# Patient Record
Sex: FEMALE | Race: OTHER | NOT HISPANIC OR LATINO
[De-identification: names, ages, dates, MRNs, and addresses within clinical notes are randomized per-mention and may not be internally consistent; named-entity substitution may affect disease eponyms.]

---

## 2019-08-22 PROBLEM — Z00.00 ENCOUNTER FOR PREVENTIVE HEALTH EXAMINATION: Status: ACTIVE | Noted: 2019-08-22

## 2019-09-12 ENCOUNTER — APPOINTMENT (OUTPATIENT)
Dept: ANTEPARTUM | Facility: CLINIC | Age: 29
End: 2019-09-12
Payer: MEDICAID

## 2019-09-12 ENCOUNTER — ASOB RESULT (OUTPATIENT)
Age: 29
End: 2019-09-12

## 2019-09-12 ENCOUNTER — APPOINTMENT (OUTPATIENT)
Dept: ANTEPARTUM | Facility: CLINIC | Age: 29
End: 2019-09-12

## 2019-09-12 PROCEDURE — 99201 OFFICE OUTPATIENT NEW 10 MINUTES: CPT | Mod: 25

## 2019-09-12 PROCEDURE — 76811 OB US DETAILED SNGL FETUS: CPT

## 2019-10-08 ENCOUNTER — OUTPATIENT (OUTPATIENT)
Dept: OUTPATIENT SERVICES | Facility: HOSPITAL | Age: 29
LOS: 1 days | Discharge: LEFT BEFORE DISCHARGE | End: 2019-10-08
Payer: MEDICAID

## 2019-10-10 DIAGNOSIS — F32.9 MAJOR DEPRESSIVE DISORDER, SINGLE EPISODE, UNSPECIFIED: ICD-10-CM

## 2019-10-15 PROCEDURE — 99212 OFFICE O/P EST SF 10 MIN: CPT

## 2019-10-29 ENCOUNTER — APPOINTMENT (OUTPATIENT)
Dept: ANTEPARTUM | Facility: CLINIC | Age: 29
End: 2019-10-29

## 2019-10-31 PROCEDURE — 99214 OFFICE O/P EST MOD 30 MIN: CPT

## 2019-12-03 PROCEDURE — 99214 OFFICE O/P EST MOD 30 MIN: CPT

## 2020-01-01 ENCOUNTER — OUTPATIENT (OUTPATIENT)
Dept: OUTPATIENT SERVICES | Facility: HOSPITAL | Age: 30
LOS: 1 days | End: 2020-01-01
Payer: MEDICAID

## 2020-01-01 PROCEDURE — G9001: CPT

## 2020-01-02 PROCEDURE — 99214 OFFICE O/P EST MOD 30 MIN: CPT

## 2020-01-07 ENCOUNTER — ASOB RESULT (OUTPATIENT)
Age: 30
End: 2020-01-07

## 2020-01-07 ENCOUNTER — APPOINTMENT (OUTPATIENT)
Dept: ANTEPARTUM | Facility: CLINIC | Age: 30
End: 2020-01-07
Payer: MEDICAID

## 2020-01-07 ENCOUNTER — APPOINTMENT (OUTPATIENT)
Dept: ANTEPARTUM | Facility: HOSPITAL | Age: 30
End: 2020-01-07

## 2020-01-07 PROCEDURE — 76816 OB US FOLLOW-UP PER FETUS: CPT

## 2020-01-07 PROCEDURE — 76819 FETAL BIOPHYS PROFIL W/O NST: CPT

## 2020-01-09 ENCOUNTER — OUTPATIENT (OUTPATIENT)
Dept: INPATIENT UNIT | Facility: HOSPITAL | Age: 30
LOS: 1 days | Discharge: HOME | End: 2020-01-09
Payer: MEDICAID

## 2020-01-09 VITALS
HEART RATE: 86 BPM | SYSTOLIC BLOOD PRESSURE: 104 MMHG | RESPIRATION RATE: 16 BRPM | DIASTOLIC BLOOD PRESSURE: 65 MMHG | TEMPERATURE: 99 F | OXYGEN SATURATION: 100 %

## 2020-01-09 VITALS — DIASTOLIC BLOOD PRESSURE: 77 MMHG | SYSTOLIC BLOOD PRESSURE: 115 MMHG | HEART RATE: 80 BPM

## 2020-01-09 DIAGNOSIS — Z98.891 HISTORY OF UTERINE SCAR FROM PREVIOUS SURGERY: Chronic | ICD-10-CM

## 2020-01-09 DIAGNOSIS — O26.899 OTHER SPECIFIED PREGNANCY RELATED CONDITIONS, UNSPECIFIED TRIMESTER: ICD-10-CM

## 2020-01-09 DIAGNOSIS — Z3A.00 WEEKS OF GESTATION OF PREGNANCY NOT SPECIFIED: ICD-10-CM

## 2020-01-09 PROCEDURE — 59025 FETAL NON-STRESS TEST: CPT | Mod: 26

## 2020-01-09 PROCEDURE — 76818 FETAL BIOPHYS PROFILE W/NST: CPT | Mod: 26

## 2020-01-09 PROCEDURE — 99214 OFFICE O/P EST MOD 30 MIN: CPT | Mod: 25

## 2020-01-09 NOTE — OB RN TRIAGE NOTE - CHIEF COMPLAINT QUOTE
contractions every 5-10 minutes and leaking some fluid since yesterday evening  *scheduled c/s 1/15*

## 2020-01-09 NOTE — OB PROVIDER TRIAGE NOTE - PSH
Previous  section  2013 c/s in [facility] arrest of dilation male   2014 repeat c/s in [place] female

## 2020-01-09 NOTE — OB RN TRIAGE NOTE - PSH
Previous  section  2013 c/s in [facility] arrest of dilation male   2014 repeat c/s in [place] female

## 2020-01-09 NOTE — CHART NOTE - NSCHARTNOTEFT_GEN_A_CORE
Received report from WHITNEY Oden NP.    BPP 8/8, cesilia 8.4, cephalic presentation, posterior placenta    nst Received report from WHITNEY Oden NP.    BPP 8/8, cesilia 8.4, cephalic presentation, posterior placenta    nst overall reactive  toco: no ctx noted    Discussed with Dr Mckeon. Dr Mckeon reviewing NST. Will continue to monitor for ~20 mins and if WNL, will d+c home with labor precautions.    Indy NP Received report from WHITNEY Oden NP.    BPP 8/8, cesilia 8.4, cephalic presentation, posterior placenta    nst overall reactive  toco: no ctx noted    Discussed with Dr Mckeon. Dr Mckeon reviewing NST. Will continue to monitor for ~20 mins and if WNL, will d+c home with labor precautions.    1418    NST reactive  toco: occ ctx noted    Discussed with Dr Rogers. Pt discharged home with labor precautions/fetal kick counts reviewed. Encouraged increased PO hydration. F/U next scheduled prenatal appointment.    JEWELL Putnam

## 2020-01-09 NOTE — OB PROVIDER TRIAGE NOTE - PROCEDURE 1
Encounter for suspected premature rupture of amniotic membranes, with rupture of membranes not found

## 2020-01-09 NOTE — OB PROVIDER TRIAGE NOTE - NSOBPROVIDERNOTE_OBGYN_ALL_OB_FT
Turkmen interpretor #  John : [id number]    30 y/o  @ 38.2 wks gest present with c/o ?PROM x's 2 days and irreg uterine ctxns x's 2 days reports 3/10 on pain scale denies any VB reports +FM denies any n/v/d denies any fever or chills ap care uncomplicated thus far pt sched for a rpt  on 1/15/2020, pt also started on Zoloft 50 mg in am x's 3 mos for depression states no counseling no hospitalizations     abdomen: soft, nt on palp  SSE; cervix appears closed and posterior   no evidence of pooling   scant amt white discharge noted  fern- neg  nitrazine- neg   SVE: closed/50/-3  T(C): 37.1 (20 @ 12:50), Max: 37.1 (20 @ 11:54)  HR: 83 (20 @ 12:56) (83 - 86)  BP: 112/69 (20 @ 12:56) (104/65 - 112/69)  RR: 18 (20 @ 12:50) (16 - 18)  SpO2: 100% (20 @ 11:54) (100% - 100%)      NKA  med hx: denies  surg hx:   x's 2  gyn hx: denies  ob hx:  2013 primary  ([place]) FT no comp  3/16/2014 repeat  ([place]) FT no comp  psychosocial hx:   depression dxed with current pregnancy and currently on Zofran 50 mg in AM began 3 mos ago   meds:  PNV  Zoloft 50 mg po in am     will continue to monitor Portuguese interpretor #  John : [id number]    30 y/o  @ 38.2 wks gest present with c/o ?PROM x's 2 days and irreg uterine ctxns x's 2 days reports 3/10 on pain scale denies any VB reports +FM denies any n/v/d denies any fever or chills ap care uncomplicated thus far pt sched for a rpt  on 1/15/2020, pt also started on Zoloft 50 mg in am x's 3 mos for depression states no counseling no hospitalizations     abdomen: soft, nt on palp  SSE; cervix appears closed and posterior   no evidence of pooling   scant amt white discharge noted  fern- neg  nitrazine- neg   SVE: closed/50/-3  T(C): 37.1 (20 @ 12:50), Max: 37.1 (20 @ 11:54)  HR: 83 (20 @ 12:56) (83 - 86)  BP: 112/69 (20 @ 12:56) (104/65 - 112/69)  RR: 18 (20 @ 12:50) (16 - 18)  SpO2: 100% (20 @ 11:54) (100% - 100%)      NKA  med hx: denies  surg hx:   x's 2  gyn hx: denies  ob hx:  2013 primary  ([place]) FT no comp  3/16/2014 repeat  ([place]) FT no comp  psychosocial hx:   depression dxed with current pregnancy and currently on Zofran 50 mg in AM began 3 mos ago   meds:  PNV  Zoloft 50 mg po in am     will continue to monitor    (see chart note)

## 2020-01-14 ENCOUNTER — TRANSCRIPTION ENCOUNTER (OUTPATIENT)
Age: 30
End: 2020-01-14

## 2020-01-15 ENCOUNTER — INPATIENT (INPATIENT)
Facility: HOSPITAL | Age: 30
LOS: 2 days | Discharge: HOME | End: 2020-01-18
Attending: OBSTETRICS & GYNECOLOGY | Admitting: OBSTETRICS & GYNECOLOGY
Payer: MEDICAID

## 2020-01-15 VITALS — SYSTOLIC BLOOD PRESSURE: 103 MMHG | HEART RATE: 88 BPM | TEMPERATURE: 98 F | DIASTOLIC BLOOD PRESSURE: 75 MMHG

## 2020-01-15 DIAGNOSIS — O34.29 MATERNAL CARE DUE TO UTERINE SCAR FROM OTHER PREVIOUS SURGERY: ICD-10-CM

## 2020-01-15 DIAGNOSIS — Z98.891 HISTORY OF UTERINE SCAR FROM PREVIOUS SURGERY: Chronic | ICD-10-CM

## 2020-01-15 DIAGNOSIS — Z98.891 HISTORY OF UTERINE SCAR FROM PREVIOUS SURGERY: ICD-10-CM

## 2020-01-15 LAB
BLD GP AB SCN SERPL QL: NEGATIVE — SIGNIFICANT CHANGE UP
HCT VFR BLD CALC: 39.2 % — SIGNIFICANT CHANGE UP (ref 34.5–45)
HGB BLD-MCNC: 12.6 G/DL — SIGNIFICANT CHANGE UP (ref 11.5–15.5)
MCHC RBC-ENTMCNC: 27.6 PG — SIGNIFICANT CHANGE UP (ref 27–34)
MCHC RBC-ENTMCNC: 32.1 % — SIGNIFICANT CHANGE UP (ref 32–36)
MCV RBC AUTO: 85.8 FL — SIGNIFICANT CHANGE UP (ref 80–100)
NRBC # FLD: 0 K/UL — SIGNIFICANT CHANGE UP (ref 0–0)
PLATELET # BLD AUTO: 204 K/UL — SIGNIFICANT CHANGE UP (ref 150–400)
PMV BLD: 11.1 FL — SIGNIFICANT CHANGE UP (ref 7–13)
RBC # BLD: 4.57 M/UL — SIGNIFICANT CHANGE UP (ref 3.8–5.2)
RBC # FLD: 14.7 % — HIGH (ref 10.3–14.5)
RH IG SCN BLD-IMP: NEGATIVE — SIGNIFICANT CHANGE UP
RH IG SCN BLD-IMP: NEGATIVE — SIGNIFICANT CHANGE UP
WBC # BLD: 7.22 K/UL — SIGNIFICANT CHANGE UP (ref 3.8–10.5)
WBC # FLD AUTO: 7.22 K/UL — SIGNIFICANT CHANGE UP (ref 3.8–10.5)

## 2020-01-15 PROCEDURE — 59514 CESAREAN DELIVERY ONLY: CPT | Mod: AS,U9

## 2020-01-15 RX ORDER — HYDROMORPHONE HYDROCHLORIDE 2 MG/ML
0.8 INJECTION INTRAMUSCULAR; INTRAVENOUS; SUBCUTANEOUS
Refills: 0 | Status: DISCONTINUED | OUTPATIENT
Start: 2020-01-15 | End: 2020-01-16

## 2020-01-15 RX ORDER — MAGNESIUM HYDROXIDE 400 MG/1
30 TABLET, CHEWABLE ORAL
Refills: 0 | Status: DISCONTINUED | OUTPATIENT
Start: 2020-01-15 | End: 2020-01-18

## 2020-01-15 RX ORDER — FERROUS SULFATE 325(65) MG
325 TABLET ORAL DAILY
Refills: 0 | Status: DISCONTINUED | OUTPATIENT
Start: 2020-01-15 | End: 2020-01-18

## 2020-01-15 RX ORDER — SERTRALINE 25 MG/1
50 TABLET, FILM COATED ORAL DAILY
Refills: 0 | Status: DISCONTINUED | OUTPATIENT
Start: 2020-01-15 | End: 2020-01-18

## 2020-01-15 RX ORDER — BUTORPHANOL TARTRATE 2 MG/ML
0.12 INJECTION, SOLUTION INTRAMUSCULAR; INTRAVENOUS EVERY 6 HOURS
Refills: 0 | Status: DISCONTINUED | OUTPATIENT
Start: 2020-01-15 | End: 2020-01-16

## 2020-01-15 RX ORDER — SODIUM CHLORIDE 9 MG/ML
1000 INJECTION, SOLUTION INTRAVENOUS
Refills: 0 | Status: DISCONTINUED | OUTPATIENT
Start: 2020-01-15 | End: 2020-01-16

## 2020-01-15 RX ORDER — MORPHINE SULFATE 50 MG/1
0.15 CAPSULE, EXTENDED RELEASE ORAL ONCE
Refills: 0 | Status: DISCONTINUED | OUTPATIENT
Start: 2020-01-15 | End: 2020-01-16

## 2020-01-15 RX ORDER — SIMETHICONE 80 MG/1
80 TABLET, CHEWABLE ORAL EVERY 4 HOURS
Refills: 0 | Status: DISCONTINUED | OUTPATIENT
Start: 2020-01-15 | End: 2020-01-18

## 2020-01-15 RX ORDER — TETANUS TOXOID, REDUCED DIPHTHERIA TOXOID AND ACELLULAR PERTUSSIS VACCINE, ADSORBED 5; 2.5; 8; 8; 2.5 [IU]/.5ML; [IU]/.5ML; UG/.5ML; UG/.5ML; UG/.5ML
0.5 SUSPENSION INTRAMUSCULAR ONCE
Refills: 0 | Status: DISCONTINUED | OUTPATIENT
Start: 2020-01-15 | End: 2020-01-18

## 2020-01-15 RX ORDER — SODIUM CHLORIDE 9 MG/ML
1000 INJECTION, SOLUTION INTRAVENOUS
Refills: 0 | Status: DISCONTINUED | OUTPATIENT
Start: 2020-01-15 | End: 2020-01-15

## 2020-01-15 RX ORDER — SENNA PLUS 8.6 MG/1
1 TABLET ORAL
Refills: 0 | Status: DISCONTINUED | OUTPATIENT
Start: 2020-01-15 | End: 2020-01-18

## 2020-01-15 RX ORDER — METOCLOPRAMIDE HCL 10 MG
10 TABLET ORAL ONCE
Refills: 0 | Status: COMPLETED | OUTPATIENT
Start: 2020-01-15 | End: 2020-01-15

## 2020-01-15 RX ORDER — OXYCODONE HYDROCHLORIDE 5 MG/1
5 TABLET ORAL
Refills: 0 | Status: COMPLETED | OUTPATIENT
Start: 2020-01-15 | End: 2020-01-22

## 2020-01-15 RX ORDER — DIPHENHYDRAMINE HCL 50 MG
25 CAPSULE ORAL EVERY 6 HOURS
Refills: 0 | Status: DISCONTINUED | OUTPATIENT
Start: 2020-01-15 | End: 2020-01-18

## 2020-01-15 RX ORDER — OXYCODONE HYDROCHLORIDE 5 MG/1
10 TABLET ORAL
Refills: 0 | Status: DISCONTINUED | OUTPATIENT
Start: 2020-01-15 | End: 2020-01-16

## 2020-01-15 RX ORDER — LANOLIN
1 OINTMENT (GRAM) TOPICAL EVERY 6 HOURS
Refills: 0 | Status: DISCONTINUED | OUTPATIENT
Start: 2020-01-15 | End: 2020-01-18

## 2020-01-15 RX ORDER — OXYCODONE HYDROCHLORIDE 5 MG/1
5 TABLET ORAL
Refills: 0 | Status: DISCONTINUED | OUTPATIENT
Start: 2020-01-15 | End: 2020-01-16

## 2020-01-15 RX ORDER — FAMOTIDINE 10 MG/ML
20 INJECTION INTRAVENOUS ONCE
Refills: 0 | Status: COMPLETED | OUTPATIENT
Start: 2020-01-15 | End: 2020-01-15

## 2020-01-15 RX ORDER — KETOROLAC TROMETHAMINE 30 MG/ML
30 SYRINGE (ML) INJECTION EVERY 6 HOURS
Refills: 0 | Status: DISCONTINUED | OUTPATIENT
Start: 2020-01-15 | End: 2020-01-16

## 2020-01-15 RX ORDER — HEPARIN SODIUM 5000 [USP'U]/ML
5000 INJECTION INTRAVENOUS; SUBCUTANEOUS EVERY 12 HOURS
Refills: 0 | Status: DISCONTINUED | OUTPATIENT
Start: 2020-01-15 | End: 2020-01-18

## 2020-01-15 RX ORDER — NALOXONE HYDROCHLORIDE 4 MG/.1ML
0.1 SPRAY NASAL
Refills: 0 | Status: DISCONTINUED | OUTPATIENT
Start: 2020-01-15 | End: 2020-01-16

## 2020-01-15 RX ORDER — ONDANSETRON 8 MG/1
4 TABLET, FILM COATED ORAL EVERY 6 HOURS
Refills: 0 | Status: DISCONTINUED | OUTPATIENT
Start: 2020-01-15 | End: 2020-01-16

## 2020-01-15 RX ORDER — INFLUENZA VIRUS VACCINE 15; 15; 15; 15 UG/.5ML; UG/.5ML; UG/.5ML; UG/.5ML
0.5 SUSPENSION INTRAMUSCULAR ONCE
Refills: 0 | Status: DISCONTINUED | OUTPATIENT
Start: 2020-01-15 | End: 2020-01-18

## 2020-01-15 RX ORDER — GLYCERIN ADULT
1 SUPPOSITORY, RECTAL RECTAL AT BEDTIME
Refills: 0 | Status: DISCONTINUED | OUTPATIENT
Start: 2020-01-15 | End: 2020-01-18

## 2020-01-15 RX ORDER — CITRIC ACID/SODIUM CITRATE 300-500 MG
30 SOLUTION, ORAL ORAL ONCE
Refills: 0 | Status: COMPLETED | OUTPATIENT
Start: 2020-01-15 | End: 2020-01-15

## 2020-01-15 RX ORDER — SODIUM CHLORIDE 9 MG/ML
1000 INJECTION, SOLUTION INTRAVENOUS ONCE
Refills: 0 | Status: COMPLETED | OUTPATIENT
Start: 2020-01-15 | End: 2020-01-15

## 2020-01-15 RX ORDER — OXYCODONE HYDROCHLORIDE 5 MG/1
5 TABLET ORAL ONCE
Refills: 0 | Status: DISCONTINUED | OUTPATIENT
Start: 2020-01-15 | End: 2020-01-18

## 2020-01-15 RX ORDER — OXYTOCIN 10 UNIT/ML
333.33 VIAL (ML) INJECTION
Qty: 20 | Refills: 0 | Status: DISCONTINUED | OUTPATIENT
Start: 2020-01-15 | End: 2020-01-16

## 2020-01-15 RX ORDER — ACETAMINOPHEN 500 MG
975 TABLET ORAL
Refills: 0 | Status: DISCONTINUED | OUTPATIENT
Start: 2020-01-15 | End: 2020-01-18

## 2020-01-15 RX ORDER — IBUPROFEN 200 MG
600 TABLET ORAL EVERY 6 HOURS
Refills: 0 | Status: COMPLETED | OUTPATIENT
Start: 2020-01-15 | End: 2020-12-13

## 2020-01-15 RX ADMIN — Medication 30 MILLILITER(S): at 10:30

## 2020-01-15 RX ADMIN — SODIUM CHLORIDE 200 MILLILITER(S): 9 INJECTION, SOLUTION INTRAVENOUS at 10:30

## 2020-01-15 RX ADMIN — SODIUM CHLORIDE 2000 MILLILITER(S): 9 INJECTION, SOLUTION INTRAVENOUS at 10:30

## 2020-01-15 RX ADMIN — Medication 1000 MILLIUNIT(S)/MIN: at 14:24

## 2020-01-15 RX ADMIN — Medication 30 MILLIGRAM(S): at 20:20

## 2020-01-15 RX ADMIN — FAMOTIDINE 20 MILLIGRAM(S): 10 INJECTION INTRAVENOUS at 10:30

## 2020-01-15 RX ADMIN — Medication 10 MILLIGRAM(S): at 10:31

## 2020-01-15 RX ADMIN — Medication 30 MILLIGRAM(S): at 20:03

## 2020-01-15 RX ADMIN — Medication 325 MILLIGRAM(S): at 20:03

## 2020-01-15 RX ADMIN — HEPARIN SODIUM 5000 UNIT(S): 5000 INJECTION INTRAVENOUS; SUBCUTANEOUS at 20:03

## 2020-01-15 RX ADMIN — SODIUM CHLORIDE 2000 MILLILITER(S): 9 INJECTION, SOLUTION INTRAVENOUS at 14:24

## 2020-01-15 RX ADMIN — SODIUM CHLORIDE 75 MILLILITER(S): 9 INJECTION, SOLUTION INTRAVENOUS at 14:24

## 2020-01-15 NOTE — OB PROVIDER H&P - ASSESSMENT
Admit to INGRID Rodriguez MD  [facility] rLTCS  NPO  routine labs  EFM/TOCO  anesthesia consult  Haylee Cavazos PAC  01-15-20 @ 10:59

## 2020-01-15 NOTE — OB RN DELIVERY SUMMARY - NS_SEPSISRSKCALC_OBGYN_ALL_OB_FT
EOS calculated successfully. EOS Risk Factor: 0.5/1000 live births ([facility] national incidence); GA=39w1d; Temp=98.42; ROM=0.017; GBS='Negative'; Antibiotics='No antibiotics or any antibiotics < 2 hrs prior to birth'

## 2020-01-15 NOTE — OB NEONATOLOGY/PEDIATRICIAN DELIVERY SUMMARY - NSPEDSNEONOTESA_OBGYN_ALL_OB_FT
28 yo  mom with hx of 2 prior C section delivered via a scheduled repeat C section at 39+ 1 weeks. Maternal hx significant for anxiety and depression on zoloft. Maternal labs: B-/ received Rhogam at 28 weeks, /NNI/ GBS negative  Baby born vigorous, routine new born care in the OR.   Mom wants to breast and bottle feed/ wants HepB, wants circ. PMD Dr Katie Lowery.

## 2020-01-15 NOTE — OB PROVIDER H&P - PSH
Previous  section  2013 c/s in [facility] arrest of dilation male   2014 repeat c/s in [place] female

## 2020-01-15 NOTE — OB RN PATIENT PROFILE - PSH
Previous  section  2013 c/s in [facility] arrest of dilation male   2014 repeat c/s in [place] female

## 2020-01-15 NOTE — OB PROVIDER H&P - PMH
Anxiety    Depression, unspecified depression type    Gastroesophageal reflux disease without esophagitis

## 2020-01-15 NOTE — OB PROVIDER DELIVERY SUMMARY - NSPROVIDERDELIVERYNOTE_OBGYN_ALL_OB_FT
Viable male infant, apgar 9/9, wgt 6.2 #  cephalic presentation, light mec  fluid  hysterotomy closed in single layer   grossly nl uterus, tubes & ovaries  EBL: 200  UOP: 100  IVF: 1500  Dictation Number: [id number]

## 2020-01-15 NOTE — OB PROVIDER H&P - HISTORY OF PRESENT ILLNESS
30yo female  EDC/  presents@ 39.1 wks for scheduled  rltcs.  AP course  unremarkable. +PMHx anxiety,depression & GERD. Takes Zoloft qd  Denies VB,ROM or UCs today.  Pt interviewed via  phone- Gianni #[id number]

## 2020-01-15 NOTE — BRIEF OPERATIVE NOTE - OPERATION/FINDINGS
Viable male infant, apgar 9/9, wgt 6.2 #  cephalic presentation, light mec  fluid  hysterotomy closed in single layer   grossly nl uterus, tubes & ovaries  EBL: 200  UOP: 100  IVF: 1500  Dictation Number: [id number]

## 2020-01-15 NOTE — OB PROVIDER H&P - NSHPPHYSICALEXAM_GEN_ALL_CORE
T(C): 36.9 (01-15-20 @ 09:47), Max: 36.9 (01-15-20 @ 09:10)  HR: 88 (01-15-20 @ 09:47) (88 - 88)  BP: 103/75 (01-15-20 @ 09:47) (103/75 - 103/75)  RR: 12 (01-15-20 @ 09:47) (12 - 12)  SpO2: --Height (cm): 165.1 (01-15-20 @ 09:47)  Weight (kg): 83.9 (01-15-20 @ 09:47)  BMI (kg/m2): 30.8 (01-15-20 @ 09:47)  BSA (m2): 1.91 (01-15-20 @ 09:47)    A&Ox3  Heart: RRR, S1&S2, no S3  Lungs: Clear bilateral to auscultation, good inspiratory /expiratory effort              no rhonchi, no rales  Abd: soft, Gravid, TOCO in place           +pfannenstial scar/hypertrophied  EFM:  120 bpm/moderate variabilty/+accelerations/no decelerations/CAT 1  TOCO:  no UC  Ext:  FROM / no edema  Neuro: grossly intact

## 2020-01-16 ENCOUNTER — TRANSCRIPTION ENCOUNTER (OUTPATIENT)
Age: 30
End: 2020-01-16

## 2020-01-16 LAB
BASOPHILS # BLD AUTO: 0.03 K/UL — SIGNIFICANT CHANGE UP (ref 0–0.2)
BASOPHILS NFR BLD AUTO: 0.3 % — SIGNIFICANT CHANGE UP (ref 0–2)
EOSINOPHIL # BLD AUTO: 0.08 K/UL — SIGNIFICANT CHANGE UP (ref 0–0.5)
EOSINOPHIL NFR BLD AUTO: 0.8 % — SIGNIFICANT CHANGE UP (ref 0–6)
HCT VFR BLD CALC: 32.7 % — LOW (ref 34.5–45)
HGB BLD-MCNC: 10.3 G/DL — LOW (ref 11.5–15.5)
IMM GRANULOCYTES NFR BLD AUTO: 0.4 % — SIGNIFICANT CHANGE UP (ref 0–1.5)
LYMPHOCYTES # BLD AUTO: 1.23 K/UL — SIGNIFICANT CHANGE UP (ref 1–3.3)
LYMPHOCYTES # BLD AUTO: 12.9 % — LOW (ref 13–44)
MCHC RBC-ENTMCNC: 27.4 PG — SIGNIFICANT CHANGE UP (ref 27–34)
MCHC RBC-ENTMCNC: 31.5 % — LOW (ref 32–36)
MCV RBC AUTO: 87 FL — SIGNIFICANT CHANGE UP (ref 80–100)
MONOCYTES # BLD AUTO: 0.6 K/UL — SIGNIFICANT CHANGE UP (ref 0–0.9)
MONOCYTES NFR BLD AUTO: 6.3 % — SIGNIFICANT CHANGE UP (ref 2–14)
NEUTROPHILS # BLD AUTO: 7.56 K/UL — HIGH (ref 1.8–7.4)
NEUTROPHILS NFR BLD AUTO: 79.3 % — HIGH (ref 43–77)
NRBC # FLD: 0 K/UL — SIGNIFICANT CHANGE UP (ref 0–0)
PLATELET # BLD AUTO: 170 K/UL — SIGNIFICANT CHANGE UP (ref 150–400)
PMV BLD: 10.5 FL — SIGNIFICANT CHANGE UP (ref 7–13)
RBC # BLD: 3.76 M/UL — LOW (ref 3.8–5.2)
RBC # FLD: 14.8 % — HIGH (ref 10.3–14.5)
T PALLIDUM AB TITR SER: NEGATIVE — SIGNIFICANT CHANGE UP
WBC # BLD: 9.54 K/UL — SIGNIFICANT CHANGE UP (ref 3.8–10.5)
WBC # FLD AUTO: 9.54 K/UL — SIGNIFICANT CHANGE UP (ref 3.8–10.5)

## 2020-01-16 RX ORDER — SERTRALINE 25 MG/1
1 TABLET, FILM COATED ORAL
Qty: 0 | Refills: 0 | DISCHARGE

## 2020-01-16 RX ORDER — OXYCODONE HYDROCHLORIDE 5 MG/1
5 TABLET ORAL
Refills: 0 | Status: DISCONTINUED | OUTPATIENT
Start: 2020-01-16 | End: 2020-01-18

## 2020-01-16 RX ORDER — IBUPROFEN 200 MG
600 TABLET ORAL EVERY 6 HOURS
Refills: 0 | Status: DISCONTINUED | OUTPATIENT
Start: 2020-01-16 | End: 2020-01-18

## 2020-01-16 RX ORDER — OXYCODONE HYDROCHLORIDE 5 MG/1
5 TABLET ORAL ONCE
Refills: 0 | Status: DISCONTINUED | OUTPATIENT
Start: 2020-01-16 | End: 2020-01-17

## 2020-01-16 RX ORDER — OXYCODONE HYDROCHLORIDE 5 MG/1
5 TABLET ORAL ONCE
Refills: 0 | Status: DISCONTINUED | OUTPATIENT
Start: 2020-01-16 | End: 2020-01-18

## 2020-01-16 RX ADMIN — SIMETHICONE 80 MILLIGRAM(S): 80 TABLET, CHEWABLE ORAL at 17:02

## 2020-01-16 RX ADMIN — Medication 30 MILLIGRAM(S): at 03:08

## 2020-01-16 RX ADMIN — HEPARIN SODIUM 5000 UNIT(S): 5000 INJECTION INTRAVENOUS; SUBCUTANEOUS at 22:29

## 2020-01-16 RX ADMIN — OXYCODONE HYDROCHLORIDE 5 MILLIGRAM(S): 5 TABLET ORAL at 22:29

## 2020-01-16 RX ADMIN — HEPARIN SODIUM 5000 UNIT(S): 5000 INJECTION INTRAVENOUS; SUBCUTANEOUS at 08:44

## 2020-01-16 RX ADMIN — OXYCODONE HYDROCHLORIDE 5 MILLIGRAM(S): 5 TABLET ORAL at 23:10

## 2020-01-16 RX ADMIN — Medication 975 MILLIGRAM(S): at 23:10

## 2020-01-16 RX ADMIN — Medication 975 MILLIGRAM(S): at 17:02

## 2020-01-16 RX ADMIN — Medication 325 MILLIGRAM(S): at 11:47

## 2020-01-16 RX ADMIN — Medication 975 MILLIGRAM(S): at 22:30

## 2020-01-16 RX ADMIN — Medication 1 TABLET(S): at 11:47

## 2020-01-16 RX ADMIN — Medication 30 MILLIGRAM(S): at 08:45

## 2020-01-16 RX ADMIN — Medication 975 MILLIGRAM(S): at 10:16

## 2020-01-16 RX ADMIN — Medication 975 MILLIGRAM(S): at 11:16

## 2020-01-16 RX ADMIN — Medication 600 MILLIGRAM(S): at 14:51

## 2020-01-16 RX ADMIN — SENNA PLUS 1 TABLET(S): 8.6 TABLET ORAL at 22:30

## 2020-01-16 RX ADMIN — SENNA PLUS 1 TABLET(S): 8.6 TABLET ORAL at 10:17

## 2020-01-16 RX ADMIN — Medication 600 MILLIGRAM(S): at 15:50

## 2020-01-16 RX ADMIN — Medication 30 MILLIGRAM(S): at 03:25

## 2020-01-16 RX ADMIN — Medication 975 MILLIGRAM(S): at 18:00

## 2020-01-16 RX ADMIN — SIMETHICONE 80 MILLIGRAM(S): 80 TABLET, CHEWABLE ORAL at 10:16

## 2020-01-16 RX ADMIN — Medication 30 MILLIGRAM(S): at 09:40

## 2020-01-16 NOTE — DISCHARGE NOTE OB - CARE PROVIDER_API CALL
Joya Rogers (DO)  Obstetrics and Gynecology  [address] [place] [address]  Phone: [phone]  Fax: [phone]  Follow Up Time:

## 2020-01-16 NOTE — PROGRESS NOTE ADULT - SUBJECTIVE AND OBJECTIVE BOX
Postop Day  1  s/p   C- Section    THERAPY:  [ X] Spinal morphine         Sedation Score:	  [ X] Alert	    [  ] Drowsy        [  ] Arousable	[  ] Asleep	[  ] Unresponsive    Side Effects:	  [ X] None	     [  ] Nausea        [  ] Pruritus        [  ] Weakness   [  ] Numbness        ASSESSMENT/ PLAN   Change to po prn pain meds 24 hours post Spinal Morphine.  [ x ]Documentation and Verification of current medications 17-Sep-2017

## 2020-01-16 NOTE — DISCHARGE NOTE OB - PATIENT PORTAL LINK FT
You can access the FollowMyHealth Patient Portal offered by [facility] by registering at the following website: [email or web address]. By joining [employer]’s FollowMyHealth portal, you will also be able to view your health information using other applications (apps) compatible with our system.

## 2020-01-16 NOTE — PROGRESS NOTE ADULT - SUBJECTIVE AND OBJECTIVE BOX
OB Progress Note:  Delivery, POD#1    S: 30yo POD#1 s/p LTCS . Her pain is well controlled. She is tolerating a regular diet and passing flatus. Denies N/V. Denies CP/SOB/lightheadedness/dizziness.   She is ambulating without difficulty.   Voiding spontaneously.     O:   Vital Signs Last 24 Hrs  T(C): 36.8 (2020 06:25), Max: 36.9 (15 Erick 2020 09:10)  T(F): 98.2 (2020 06:25), Max: 98.42 (15 Erick 2020 09:10)  HR: 102 (2020 06:25) (73 - 103)  BP: 117/73 (2020 06:25) (78/40 - 117/73)  BP(mean): 71 (15 Erick 2020 16:00) (64 - 79)  RR: 16 (2020 06:25) (12 - 20)  SpO2: 100% (2020 06:25) (100% - 100%)    Labs:  Blood type: B Negative  Rubella IgG: RPR: Negative                          10.3<L>   9.54 >-----------< 170    (  @ 06:45 )             32.7<L>                        12.6   7.22 >-----------< 204    ( 01-15 @ 09:15 )             39.2                  PE:  General: NAD  Abdomen: Mildly distended, appropriately tender, incision c/d/i.  Extremities: No erythema, no pitting edema

## 2020-01-16 NOTE — PROGRESS NOTE ADULT - PROBLEM SELECTOR PLAN 1
- Continue regular diet.  - Increase ambulation.  - Continue motrin, tylenol, oxycodone PRN for pain control.  - F/u AM DI Craig PGY1

## 2020-01-16 NOTE — DISCHARGE NOTE OB - MATERIALS PROVIDED
[facility] Hearing Screen Program/Birth Certificate Instructions/  Immunization Record/[facility]  Screening Program/Guide to Postpartum Care/Vaccinations/Shaken Baby Prevention Handout

## 2020-01-17 RX ADMIN — SENNA PLUS 1 TABLET(S): 8.6 TABLET ORAL at 17:47

## 2020-01-17 RX ADMIN — Medication 600 MILLIGRAM(S): at 12:40

## 2020-01-17 RX ADMIN — MAGNESIUM HYDROXIDE 30 MILLILITER(S): 400 TABLET, CHEWABLE ORAL at 12:15

## 2020-01-17 RX ADMIN — Medication 600 MILLIGRAM(S): at 17:45

## 2020-01-17 RX ADMIN — SERTRALINE 50 MILLIGRAM(S): 25 TABLET, FILM COATED ORAL at 12:12

## 2020-01-17 RX ADMIN — Medication 975 MILLIGRAM(S): at 10:05

## 2020-01-17 RX ADMIN — Medication 600 MILLIGRAM(S): at 05:40

## 2020-01-17 RX ADMIN — Medication 600 MILLIGRAM(S): at 07:01

## 2020-01-17 RX ADMIN — Medication 975 MILLIGRAM(S): at 10:35

## 2020-01-17 RX ADMIN — Medication 600 MILLIGRAM(S): at 12:10

## 2020-01-17 RX ADMIN — Medication 600 MILLIGRAM(S): at 18:15

## 2020-01-17 RX ADMIN — SIMETHICONE 80 MILLIGRAM(S): 80 TABLET, CHEWABLE ORAL at 17:46

## 2020-01-17 RX ADMIN — Medication 325 MILLIGRAM(S): at 12:12

## 2020-01-17 RX ADMIN — SIMETHICONE 80 MILLIGRAM(S): 80 TABLET, CHEWABLE ORAL at 12:12

## 2020-01-17 RX ADMIN — Medication 1 TABLET(S): at 12:12

## 2020-01-17 RX ADMIN — HEPARIN SODIUM 5000 UNIT(S): 5000 INJECTION INTRAVENOUS; SUBCUTANEOUS at 10:46

## 2020-01-17 RX ADMIN — HEPARIN SODIUM 5000 UNIT(S): 5000 INJECTION INTRAVENOUS; SUBCUTANEOUS at 22:30

## 2020-01-17 NOTE — PROGRESS NOTE ADULT - SUBJECTIVE AND OBJECTIVE BOX
SUBJECTIVE:    Pain: Controlled    Complaints: None    MILESTONES:    Alert and Oriented x 3  [ x ]  Out of bed/ ambulating. [ x ]  Flatus:   Positive [ x ]  Negative [  ]  Bowel movement  [  ] Positive [  ] Negative   Voiding [x  ] Due to void [  ]   Juarez/Indwelling catheter in place [  ]  Diet: Regular [ x ]  Clears [  ]  NPO [  ]    Infant feeding:  Breast [  ]   Bottle [  ]  Both [ X ]  Feeding related issues and/or concerns:      OBJECTIVE:  T(C): 36.5 (20 @ 06:24), Max: 37 (20 @ 14:25)  HR: 101 (20 @ 06:24) (93 - 101)  BP: 115/73 (20 @ 06:24) (104/63 - 115/73)  RR: 18 (20 @ 06:24) (18 - 18)  SpO2: 100% (20 @ 06:24) (100% - 100%)  Wt(kg): --                        10.3   9.54  )-----------( 170      ( 2020 06:45 )             32.7           Blood Type: B Negative    RPR: Negative          MEDICATIONS  (STANDING):  acetaminophen   Tablet .. 975 milliGRAM(s) Oral <User Schedule>  diphtheria/tetanus/pertussis (acellular) Vaccine (ADAcel) 0.5 milliLiter(s) IntraMuscular once  ferrous    sulfate 325 milliGRAM(s) Oral daily  heparin  Injectable 5000 Unit(s) SubCutaneous every 12 hours  ibuprofen  Tablet. 600 milliGRAM(s) Oral every 6 hours  influenza   Vaccine 0.5 milliLiter(s) IntraMuscular once  prenatal multivitamin 1 Tablet(s) Oral daily  sertraline 50 milliGRAM(s) Oral daily    MEDICATIONS  (PRN):  diphenhydrAMINE 25 milliGRAM(s) Oral every 6 hours PRN Itching  glycerin Suppository - Adult 1 Suppository(s) Rectal at bedtime PRN Constipation  lanolin Ointment 1 Application(s) Topical every 6 hours PRN Sore Nipples  magnesium hydroxide Suspension 30 milliLiter(s) Oral two times a day PRN Constipation  oxyCODONE    IR 5 milliGRAM(s) Oral once PRN Moderate to Severe Pain (4-10)  oxyCODONE    IR 5 milliGRAM(s) Oral once PRN Moderate to Severe Pain (4-10)  oxyCODONE    IR 5 milliGRAM(s) Oral every 3 hours PRN Moderate to Severe Pain (4-10)  senna 1 Tablet(s) Oral two times a day PRN Constipation  simethicone 80 milliGRAM(s) Chew every 4 hours PRN Gas        ASSESSMENT:    29y     G   3   P    3003     PO Day#  2        Delivery: Primary [  ]    Repeat [ X ]      QBL - 200                                   Indication of procedure: Scheduled    Condition: Stable    Past Medical History significant for: HPI:  28yo female  EDC/  presents@ 39.1 wks for scheduled  rltcs.  AP course  unremarkable. +PMHx anxiety,depression & GERD. Takes Zoloft qd  Denies VB,ROM or UCs today.  Pt interviewed via  phone- Gianni #[id number] (15 Erick 2020 10:47)      Current Issues:    Breasts:  Soft [x  ]   Engorged [  ]  Nipples:  Abdomen: Soft [ x ]   Distended [  ] Nontender [  ]     Bowel sounds :  Present [  ]  Absent [  ]   Fundus:  Firm [x  ]  Boggy [  ]    Abdominal incision: Clean, Dry and Intact [x  ]  Staples [  ] Steri Strips [ X ] Dermabond [  ] Sutures [  ]    Patient wearing abdominal binder for support.    Vaginal: Lochia:  Heavy [  ]  Moderate [ x ]   Scant [  ]    Extremities: Edema [  ] Negative Angel's Sign [  ] Nontender Fidel  [ x ] Positive pedal pulses [  ]    Other relevant physical exam findings:      PLAN:    Plan: Increase ambulation, analgesia PRN and pain medication protocol standing oxycodone, ibuprofen and acetaminophen.    Diet: Regular diet    Continue routine post-operative and postpartum care.  SW Consult for a Hx. of Anxiety/Depression, on Zoloft X 3 Months.   Stopped taking Zoloft on her own and does not want to take it when offered by her RN. Discussed with Dr. Rodriguez, who will call the patient and speak with her.    Discharge Planning [ x ]    For discharge Today  [    ]    Consults:  Social Work [ X ]  Lactation [ x ]  Other [         ]

## 2020-01-18 VITALS
HEART RATE: 100 BPM | DIASTOLIC BLOOD PRESSURE: 64 MMHG | RESPIRATION RATE: 18 BRPM | OXYGEN SATURATION: 100 % | SYSTOLIC BLOOD PRESSURE: 105 MMHG | TEMPERATURE: 98 F

## 2020-01-18 RX ORDER — ACETAMINOPHEN 500 MG
3 TABLET ORAL
Qty: 0 | Refills: 0 | DISCHARGE
Start: 2020-01-18

## 2020-01-18 RX ORDER — IBUPROFEN 200 MG
1 TABLET ORAL
Qty: 0 | Refills: 0 | DISCHARGE
Start: 2020-01-18

## 2020-01-18 RX ADMIN — HEPARIN SODIUM 5000 UNIT(S): 5000 INJECTION INTRAVENOUS; SUBCUTANEOUS at 10:20

## 2020-01-18 RX ADMIN — Medication 975 MILLIGRAM(S): at 06:08

## 2020-01-18 RX ADMIN — Medication 600 MILLIGRAM(S): at 03:40

## 2020-01-18 RX ADMIN — Medication 600 MILLIGRAM(S): at 03:03

## 2020-01-18 NOTE — PROGRESS NOTE ADULT - PROBLEM SELECTOR PLAN 1
- Continue motrin, tylenol, oxycodone PRN for pain control.  - Increase ambulation  - Continue regular diet  - Discharge planning    Rafa PGY1

## 2020-01-18 NOTE — PROGRESS NOTE ADULT - SUBJECTIVE AND OBJECTIVE BOX
OB Postpartum Note:  Delivery, POD#3    S: 28yo POD#3 s/p LTCS. The patient feels well.  Pain is well controlled. She is tolerating a regular diet and passing flatus. She is voiding spontaneously, and ambulating without difficulty. Denies CP/SOB. Denies lightheadedness/dizziness. Denies N/V.    O:  Vitals:  Vital Signs Last 24 Hrs  T(C): 36.6 (2020 21:35), Max: 36.6 (2020 14:00)  T(F): 97.9 (2020 21:35), Max: 97.9 (2020 14:00)  HR: 100 (2020 21:35) (100 - 101)  BP: 119/69 (2020 21:35) (109/73 - 119/69)  BP(mean): --  RR: 18 (2020 21:35) (18 - 18)  SpO2: 99% (2020 21:35) (99% - 100%)    MEDICATIONS  (STANDING):  acetaminophen   Tablet .. 975 milliGRAM(s) Oral <User Schedule>  diphtheria/tetanus/pertussis (acellular) Vaccine (ADAcel) 0.5 milliLiter(s) IntraMuscular once  ferrous    sulfate 325 milliGRAM(s) Oral daily  heparin  Injectable 5000 Unit(s) SubCutaneous every 12 hours  ibuprofen  Tablet. 600 milliGRAM(s) Oral every 6 hours  influenza   Vaccine 0.5 milliLiter(s) IntraMuscular once  prenatal multivitamin 1 Tablet(s) Oral daily  sertraline 50 milliGRAM(s) Oral daily    MEDICATIONS  (PRN):  diphenhydrAMINE 25 milliGRAM(s) Oral every 6 hours PRN Itching  glycerin Suppository - Adult 1 Suppository(s) Rectal at bedtime PRN Constipation  lanolin Ointment 1 Application(s) Topical every 6 hours PRN Sore Nipples  magnesium hydroxide Suspension 30 milliLiter(s) Oral two times a day PRN Constipation  oxyCODONE    IR 5 milliGRAM(s) Oral once PRN Moderate to Severe Pain (4-10)  oxyCODONE    IR 5 milliGRAM(s) Oral once PRN Moderate to Severe Pain (4-10)  oxyCODONE    IR 5 milliGRAM(s) Oral every 3 hours PRN Moderate to Severe Pain (4-10)  senna 1 Tablet(s) Oral two times a day PRN Constipation  simethicone 80 milliGRAM(s) Chew every 4 hours PRN Gas      LABS:  Blood type: B Negative  Rubella IgG: RPR: Negative                          10.3<L>   9.54 >-----------< 170    (  @ 06:45 )             32.7<L>                        12.6   7.22 >-----------< 204    ( 01-15 @ 09:15 )             39.2                  Physical exam:  Gen: NAD  Abdomen: Soft, nontender, no distension , firm uterine fundus at umbilicus.  Incision: Clean, dry, and intact   Pelvic: Normal lochia noted  Ext: No calf tenderness

## 2020-01-21 DIAGNOSIS — Z71.89 OTHER SPECIFIED COUNSELING: ICD-10-CM

## 2020-11-02 NOTE — OB RN PATIENT PROFILE - NS_TUBALPLANNED_OBGYN_ALL_OB
[phone]     No

## 2021-04-06 NOTE — OB NEONATOLOGY/PEDIATRICIAN DELIVERY SUMMARY - NSCSDELIVERYA_OBGYN_ALL_OB
Scheduled/Repeat
You can access the FollowMyHealth Patient Portal offered by [facility] by registering at the following website: [email or web address]. By joining [employer]’s FollowMyHealth portal, you will also be able to view your health information using other applications (apps) compatible with our system.

## 2021-10-27 NOTE — OB PROVIDER DELIVERY SUMMARY - AMNIOTIC FLUID ODOR, LABOR
Likely related to puberty - wait 2 weeks and if still sore may be seen in office for evaluation    normal
